# Patient Record
Sex: FEMALE | Race: BLACK OR AFRICAN AMERICAN | Employment: UNEMPLOYED | URBAN - METROPOLITAN AREA
[De-identification: names, ages, dates, MRNs, and addresses within clinical notes are randomized per-mention and may not be internally consistent; named-entity substitution may affect disease eponyms.]

---

## 2023-10-02 ENCOUNTER — CLINICAL SUPPORT (OUTPATIENT)
Dept: URGENT CARE | Facility: CLINIC | Age: 63
End: 2023-10-02
Payer: COMMERCIAL

## 2023-10-02 VITALS
HEART RATE: 79 BPM | RESPIRATION RATE: 17 BRPM | TEMPERATURE: 98.7 F | DIASTOLIC BLOOD PRESSURE: 85 MMHG | SYSTOLIC BLOOD PRESSURE: 158 MMHG | BODY MASS INDEX: 33.65 KG/M2 | OXYGEN SATURATION: 95 % | WEIGHT: 166.6 LBS

## 2023-10-02 DIAGNOSIS — M54.50 ACUTE BILATERAL LOW BACK PAIN WITHOUT SCIATICA: Primary | ICD-10-CM

## 2023-10-02 PROCEDURE — 99213 OFFICE O/P EST LOW 20 MIN: CPT | Performed by: NURSE PRACTITIONER

## 2023-10-02 PROCEDURE — 1036F TOBACCO NON-USER: CPT | Performed by: NURSE PRACTITIONER

## 2023-10-02 RX ORDER — AMLODIPINE BESYLATE 5 MG/1
5 TABLET ORAL DAILY
COMMUNITY

## 2023-10-02 RX ORDER — PREDNISONE 20 MG/1
40 TABLET ORAL DAILY
Qty: 10 TABLET | Refills: 0 | Status: SHIPPED | OUTPATIENT
Start: 2023-10-02 | End: 2023-10-07

## 2023-10-02 RX ORDER — PANTOPRAZOLE SODIUM 20 MG/1
20 TABLET, DELAYED RELEASE ORAL
COMMUNITY

## 2023-10-02 ASSESSMENT — ENCOUNTER SYMPTOMS
BACK PAIN: 1
LEG PAIN: 1

## 2023-10-02 NOTE — PROGRESS NOTES
Subjective   Patient ID: Nicole Garcia is a 63 y.o. female.    Patient complains of lower back pain.  States symptoms started 2 weeks ago.  States the pain is located bilateral lower extremities rates it 6 out of 10.  States the pain is aggravated by walking and mostly at night and alleviated by resting.  Patient denies any history of IV drug use, cancer, chemotherapy in the last 6 months, incontinence of bladder or bladder, saddle anesthesia.  Patient also denies any weakness, falls or secondary trauma        Leg Pain     Back Pain  Associated symptoms include leg pain.       The following portions of the chart were reviewed this encounter and updated as appropriate:         Review of Systems   Musculoskeletal:  Positive for back pain.   All other systems reviewed and are negative.    Objective   Physical Exam  Vitals and nursing note reviewed.   Constitutional:       General: She is not in acute distress.     Appearance: Normal appearance. She is not toxic-appearing.   HENT:      Head: Normocephalic.      Right Ear: External ear normal.      Left Ear: External ear normal.      Nose: Nose normal.      Mouth/Throat:      Mouth: Mucous membranes are moist.      Pharynx: No oropharyngeal exudate or posterior oropharyngeal erythema.   Eyes:      Extraocular Movements: Extraocular movements intact.   Cardiovascular:      Rate and Rhythm: Normal rate and regular rhythm.      Heart sounds: Normal heart sounds.   Pulmonary:      Effort: Pulmonary effort is normal.      Breath sounds: Normal breath sounds. No wheezing.   Musculoskeletal:         General: Normal range of motion.      Cervical back: Normal, normal range of motion and neck supple.      Thoracic back: Normal.      Lumbar back: Normal. Negative right straight leg raise test and negative left straight leg raise test.   Skin:     Capillary Refill: Capillary refill takes less than 2 seconds.   Neurological:      General: No focal deficit present.      Mental  Status: She is alert and oriented to person, place, and time.      Cranial Nerves: Cranial nerves 2-12 are intact.      Sensory: Sensation is intact.      Motor: Motor function is intact.      Gait: Gait is intact.      Deep Tendon Reflexes: Reflexes are normal and symmetric.      Reflex Scores:       Patellar reflexes are 2+ on the right side and 2+ on the left side.  Psychiatric:         Mood and Affect: Mood normal.         Behavior: Behavior normal.         Thought Content: Thought content normal.       Procedures    Assessment/Plan   Diagnoses and all orders for this visit:  Acute bilateral low back pain without sciatica  -     predniSONE (Deltasone) 20 mg tablet; Take 2 tablets (40 mg) by mouth once daily for 5 days.  Start prednisone therapy  May also use Tylenol  Inform patient that she is going to follow-up with this practice this may be musculoskeletal versus circulatory she may benefit from possible ankle-brachial index for further evaluation    Above plan of care was reviewed with the patient, all questions were answered, through shared decision making the patient agrees with this plan of care.    Red flags for strict return precaution have been reviewed with the patient and or patient tiffanydian, patient is alert, oriented and non-toxic appearing, has decision making capabilities and agrees with the plan of care through shared decision making at this time. Current diagnosis, any medication changes, lab or radiologic results have been reviewed with the patient at the time of visit. If symptoms do not improve, or worsen, patient is to follow up with PCP or report to the emergency room.   Patient is alert and oriented x3 vital signs stable nontoxic-appearing and has decision-making capabilities.    Please note that the majority this note was made with Dragon speaking software there may be grammatical errors secondary to the speaking program.

## 2023-10-08 PROBLEM — K02.9 PAIN DUE TO DENTAL CARIES: Status: ACTIVE | Noted: 2023-10-08

## 2023-10-08 PROBLEM — M54.30 SCIATIC LEG PAIN: Status: ACTIVE | Noted: 2023-10-08

## 2023-10-08 PROBLEM — M25.562 KNEE PAIN, BILATERAL: Status: ACTIVE | Noted: 2023-10-08

## 2023-10-08 PROBLEM — M25.561 KNEE PAIN, BILATERAL: Status: ACTIVE | Noted: 2023-10-08

## 2023-10-13 ENCOUNTER — APPOINTMENT (OUTPATIENT)
Dept: PRIMARY CARE | Facility: CLINIC | Age: 63
End: 2023-10-13
Payer: COMMERCIAL

## 2024-06-12 ENCOUNTER — OFFICE VISIT (OUTPATIENT)
Dept: URGENT CARE | Facility: CLINIC | Age: 64
End: 2024-06-12
Payer: COMMERCIAL

## 2024-06-12 VITALS
HEIGHT: 59 IN | WEIGHT: 171 LBS | BODY MASS INDEX: 34.47 KG/M2 | SYSTOLIC BLOOD PRESSURE: 146 MMHG | HEART RATE: 78 BPM | TEMPERATURE: 97.9 F | DIASTOLIC BLOOD PRESSURE: 83 MMHG | OXYGEN SATURATION: 95 %

## 2024-06-12 DIAGNOSIS — M25.511 ACUTE PAIN OF RIGHT SHOULDER: ICD-10-CM

## 2024-06-12 DIAGNOSIS — M15.9 OSTEOARTHRITIS OF MULTIPLE JOINTS, UNSPECIFIED OSTEOARTHRITIS TYPE: Primary | ICD-10-CM

## 2024-06-12 DIAGNOSIS — G89.29 CHRONIC RIGHT-SIDED LOW BACK PAIN WITH RIGHT-SIDED SCIATICA: ICD-10-CM

## 2024-06-12 DIAGNOSIS — M54.41 CHRONIC RIGHT-SIDED LOW BACK PAIN WITH RIGHT-SIDED SCIATICA: ICD-10-CM

## 2024-06-12 PROBLEM — I10 ESSENTIAL HYPERTENSION: Status: ACTIVE | Noted: 2017-06-22

## 2024-06-12 PROBLEM — E78.5 HYPERLIPIDEMIA: Status: ACTIVE | Noted: 2023-07-24

## 2024-06-12 PROBLEM — R19.5 POSITIVE COLORECTAL CANCER SCREENING USING COLOGUARD TEST: Status: ACTIVE | Noted: 2023-08-29

## 2024-06-12 PROBLEM — G45.9 TIA (TRANSIENT ISCHEMIC ATTACK): Status: ACTIVE | Noted: 2023-11-21

## 2024-06-12 PROBLEM — E55.9 VITAMIN D DEFICIENCY: Status: ACTIVE | Noted: 2023-07-24

## 2024-06-12 PROBLEM — E66.9 OBESITY (BMI 30-39.9): Status: ACTIVE | Noted: 2021-05-26

## 2024-06-12 PROBLEM — K21.9 GASTROESOPHAGEAL REFLUX DISEASE WITHOUT ESOPHAGITIS: Status: ACTIVE | Noted: 2017-10-15

## 2024-06-12 PROBLEM — M19.90 OSTEOARTHRITIS: Status: ACTIVE | Noted: 2024-06-12

## 2024-06-12 PROBLEM — K02.9 DENTAL CARIES: Status: ACTIVE | Noted: 2022-06-08

## 2024-06-12 PROBLEM — H25.9 AGE-RELATED CATARACT OF RIGHT EYE: Status: ACTIVE | Noted: 2024-02-02

## 2024-06-12 PROBLEM — F33.9 CHRONIC RECURRENT MAJOR DEPRESSIVE DISORDER (CMS-HCC): Status: ACTIVE | Noted: 2022-01-28

## 2024-06-12 PROBLEM — R73.03 PREDIABETES: Status: ACTIVE | Noted: 2017-10-15

## 2024-06-12 PROCEDURE — 96372 THER/PROPH/DIAG INJ SC/IM: CPT

## 2024-06-12 PROCEDURE — 3077F SYST BP >= 140 MM HG: CPT

## 2024-06-12 PROCEDURE — 3079F DIAST BP 80-89 MM HG: CPT

## 2024-06-12 PROCEDURE — 99213 OFFICE O/P EST LOW 20 MIN: CPT

## 2024-06-12 RX ORDER — POLYETHYLENE GLYCOL-3350 AND ELECTROLYTES 236; 6.74; 5.86; 2.97; 22.74 G/274.31G; G/274.31G; G/274.31G; G/274.31G; G/274.31G
POWDER, FOR SOLUTION ORAL
COMMUNITY
Start: 2023-08-23

## 2024-06-12 RX ORDER — TIZANIDINE 2 MG/1
TABLET ORAL
COMMUNITY
Start: 2024-01-26 | End: 2024-06-12 | Stop reason: ALTCHOICE

## 2024-06-12 RX ORDER — ATORVASTATIN CALCIUM 40 MG/1
1 TABLET, FILM COATED ORAL
COMMUNITY
Start: 2023-11-13

## 2024-06-12 RX ORDER — ASPIRIN 81 MG/1
81 TABLET ORAL
COMMUNITY

## 2024-06-12 RX ORDER — ACETAMINOPHEN 500 MG
TABLET ORAL
COMMUNITY
Start: 2024-01-26

## 2024-06-12 RX ORDER — ERGOCALCIFEROL 1.25 1/1
CAPSULE ORAL
COMMUNITY
Start: 2023-08-23

## 2024-06-12 RX ORDER — METHOCARBAMOL 500 MG/1
TABLET, FILM COATED ORAL
COMMUNITY
Start: 2023-10-27

## 2024-06-12 RX ORDER — PREDNISONE 20 MG/1
TABLET ORAL
COMMUNITY
Start: 2023-10-27 | End: 2024-06-12 | Stop reason: ALTCHOICE

## 2024-06-12 RX ORDER — DICLOFENAC SODIUM 10 MG/G
GEL TOPICAL
COMMUNITY
Start: 2023-09-20

## 2024-06-12 RX ORDER — BROMPHENIRAMINE MALEATE, PSEUDOEPHEDRINE HYDROCHLORIDE, AND DEXTROMETHORPHAN HYDROBROMIDE 2; 30; 10 MG/5ML; MG/5ML; MG/5ML
SYRUP ORAL
COMMUNITY
Start: 2023-12-19

## 2024-06-12 RX ORDER — SPIRONOLACTONE 25 MG/1
0.5 TABLET ORAL DAILY
COMMUNITY
Start: 2022-08-30

## 2024-06-12 RX ORDER — PREDNISOLONE 15 MG/5ML
SOLUTION ORAL DAILY
Qty: 50 ML | Refills: 0 | Status: SHIPPED | OUTPATIENT
Start: 2024-06-12 | End: 2024-06-20

## 2024-06-12 RX ORDER — DULOXETIN HYDROCHLORIDE 30 MG/1
CAPSULE, DELAYED RELEASE ORAL
COMMUNITY
Start: 2024-02-02

## 2024-06-12 RX ORDER — KETOROLAC TROMETHAMINE 30 MG/ML
30 INJECTION, SOLUTION INTRAMUSCULAR; INTRAVENOUS ONCE
Status: COMPLETED | OUTPATIENT
Start: 2024-06-12 | End: 2024-06-12

## 2024-06-12 RX ORDER — PREDNISONE 50 MG/1
TABLET ORAL
COMMUNITY
Start: 2023-12-15 | End: 2024-06-12 | Stop reason: ALTCHOICE

## 2024-06-12 RX ORDER — IBUPROFEN 400 MG/1
TABLET ORAL
COMMUNITY
Start: 2024-02-02 | End: 2024-06-12 | Stop reason: ALTCHOICE

## 2024-06-12 RX ORDER — ROSUVASTATIN CALCIUM 20 MG/1
TABLET, COATED ORAL
COMMUNITY
Start: 2023-11-22

## 2024-06-12 RX ORDER — AMOXICILLIN AND CLAVULANATE POTASSIUM 875; 125 MG/1; MG/1
TABLET, FILM COATED ORAL
COMMUNITY
Start: 2023-12-19 | End: 2024-06-12 | Stop reason: ALTCHOICE

## 2024-06-12 RX ORDER — CLOPIDOGREL BISULFATE 75 MG/1
1 TABLET ORAL
COMMUNITY
Start: 2023-11-13

## 2024-06-12 RX ORDER — TIZANIDINE 4 MG/1
4 TABLET ORAL EVERY 8 HOURS PRN
Qty: 21 TABLET | Refills: 0 | Status: SHIPPED | OUTPATIENT
Start: 2024-06-12 | End: 2024-06-19

## 2024-06-12 RX ORDER — OMEPRAZOLE 20 MG/1
CAPSULE, DELAYED RELEASE ORAL
COMMUNITY
Start: 2024-01-19

## 2024-06-12 ASSESSMENT — ENCOUNTER SYMPTOMS
ARTHRALGIAS: 1
DIARRHEA: 0
CHILLS: 0
ABDOMINAL PAIN: 0
NEUROLOGICAL NEGATIVE: 1
FEVER: 0
MYALGIAS: 1
NAUSEA: 0
CARDIOVASCULAR NEGATIVE: 1
SHORTNESS OF BREATH: 0
MUSCULOSKELETAL PAIN: 1
HEADACHES: 0
CONSTITUTIONAL NEGATIVE: 1
COUGH: 0
DIZZINESS: 0
FATIGUE: 0
PALPITATIONS: 0
DIAPHORESIS: 0
RESPIRATORY NEGATIVE: 1
VOMITING: 0
GASTROINTESTINAL NEGATIVE: 1

## 2024-06-12 NOTE — PROGRESS NOTES
"Subjective   History  Nicole Garcia is a 63 y.o. female who presents for Muscle Pain.    Patient presents with pain in her right thigh, knee, and arm for the last 7 days. She reports a history of arthritis, knee pain, and right sided sciatica. She reports that the pain seems worst at night.       History provided by:  Patient and medical records   used: No    Muscle Pain  Associated symptoms: myalgias    Associated symptoms: no abdominal pain, no chest pain, no congestion, no cough, no diarrhea, no fatigue, no fever, no headaches, no nausea, no rash, no rhinorrhea, no shortness of breath, no sore throat and no vomiting        No past surgical history on file.  Social History     Tobacco Use    Smoking status: Never    Smokeless tobacco: Never   Substance Use Topics    Alcohol use: Never    Drug use: Never       Review of Systems   Constitutional: Negative.  Negative for chills, diaphoresis, fatigue and fever.   HENT: Negative.  Negative for congestion, facial swelling, rhinorrhea, sore throat, trouble swallowing and voice change.    Respiratory: Negative.  Negative for cough and shortness of breath.    Cardiovascular: Negative.  Negative for chest pain and palpitations.   Gastrointestinal: Negative.  Negative for abdominal pain, diarrhea, nausea and vomiting.   Musculoskeletal:  Positive for arthralgias and myalgias.   Skin: Negative.  Negative for rash.   Neurological: Negative.  Negative for dizziness and headaches.       Objective   Vital Signs  /83 (BP Location: Right arm, Patient Position: Sitting, BP Cuff Size: Small adult)   Pulse 78   Temp 36.6 °C (97.9 °F) (Oral)   Ht 1.499 m (4' 11\")   Wt 77.6 kg (171 lb)   SpO2 95%   BMI 34.54 kg/m²    All vitals have been reviewed and are stable.    Diagnostic Results    No results found for this or any previous visit (from the past 24 hour(s)).     Physical Exam  Physical Exam  Vitals and nursing note reviewed.   Constitutional:       " General: She is awake. She is not in acute distress.     Appearance: Normal appearance. She is well-developed. She is not ill-appearing, toxic-appearing or diaphoretic.   HENT:      Head: Normocephalic and atraumatic. No right periorbital erythema or left periorbital erythema.      Jaw: There is normal jaw occlusion.      Right Ear: External ear normal.      Left Ear: External ear normal.      Nose: Nose normal. No congestion or rhinorrhea.      Mouth/Throat:      Lips: Pink. No lesions.      Mouth: Mucous membranes are moist. No oral lesions or angioedema.      Pharynx: Oropharynx is clear.   Eyes:      General: Lids are normal. Vision grossly intact. Gaze aligned appropriately.      Extraocular Movements: Extraocular movements intact.      Conjunctiva/sclera: Conjunctivae normal.      Right eye: Right conjunctiva is not injected.      Left eye: Left conjunctiva is not injected.      Pupils: Pupils are equal, round, and reactive to light.   Cardiovascular:      Rate and Rhythm: Normal rate and regular rhythm.      Heart sounds: Normal heart sounds, S1 normal and S2 normal.   Pulmonary:      Effort: Pulmonary effort is normal. No tachypnea or respiratory distress.      Breath sounds: Normal air entry. No stridor. No wheezing.   Abdominal:      General: Abdomen is flat. There is no distension.      Palpations: Abdomen is soft.   Musculoskeletal:         General: No swelling or deformity.      Right shoulder: Swelling and tenderness present. No effusion or bony tenderness. Normal range of motion. Normal strength. Normal pulse.      Left shoulder: Normal.      Right upper arm: Normal. No swelling or tenderness.      Left upper arm: Normal. No swelling or tenderness.      Cervical back: Normal, full passive range of motion without pain, normal range of motion and neck supple. No edema or bony tenderness. No pain with movement or spinous process tenderness. Normal range of motion.      Thoracic back: Normal. No spasms,  tenderness or bony tenderness. Normal range of motion.      Lumbar back: Spasms and tenderness present. No swelling, deformity or bony tenderness. Decreased range of motion.      Right upper leg: Tenderness present. No swelling, deformity or bony tenderness.      Left upper leg: Normal.      Right knee: Normal. No swelling, erythema or bony tenderness. Normal range of motion. No tenderness.      Left knee: Normal.      Right lower leg: Normal. No swelling.      Left lower leg: Normal. No swelling.   Lymphadenopathy:      Cervical: No cervical adenopathy.   Skin:     General: Skin is warm and dry.      Findings: No erythema or rash.   Neurological:      General: No focal deficit present.      Mental Status: She is alert and oriented to person, place, and time. Mental status is at baseline.      Motor: Motor function is intact.      Coordination: Coordination is intact.   Psychiatric:         Mood and Affect: Mood and affect normal.         Speech: Speech normal.         Behavior: Behavior normal. Behavior is cooperative.         Thought Content: Thought content normal.         Judgment: Judgment normal.         Assessment/Plan     Problem List Items Addressed This Visit       Chronic right-sided low back pain with right-sided sciatica    Relevant Medications    prednisoLONE (Prelone) 15 mg/5 mL syrup    tiZANidine (Zanaflex) 4 mg tablet    ketorolac (Toradol) injection 30 mg (Start on 6/12/2024  7:00 PM)    Osteoarthritis - Primary    Relevant Medications    prednisoLONE (Prelone) 15 mg/5 mL syrup    ketorolac (Toradol) injection 30 mg (Start on 6/12/2024  7:00 PM)     Other Visit Diagnoses       Acute pain of right shoulder        Relevant Medications    prednisoLONE (Prelone) 15 mg/5 mL syrup    tiZANidine (Zanaflex) 4 mg tablet    ketorolac (Toradol) injection 30 mg (Start on 6/12/2024  7:00 PM)            [facility] Course  MDM    Patient disposition: Home    Red flags for reporting to ER have been reviewed with the  patient.    Current diagnosis, any medication changes, and all in-office lab or radiologic results have been reviewed with the patient at the time of the visit.   If symptoms do not improve or worsen, patient is to follow up with PCP or report to the emergency room.   Patient is alert and oriented x3 and non-toxic appearing. Vital signs are stable.   Patient and/or guardian has sufficient decision-making capabilities at this time and reports understanding and agreement with the treatment plan made through shared decision-making.

## 2024-06-27 ASSESSMENT — ENCOUNTER SYMPTOMS
RHINORRHEA: 0
VOICE CHANGE: 0
SORE THROAT: 0
TROUBLE SWALLOWING: 0
FACIAL SWELLING: 0

## 2024-06-27 ASSESSMENT — VISUAL ACUITY: OU: 1

## 2024-12-29 ENCOUNTER — OFFICE VISIT (OUTPATIENT)
Dept: URGENT CARE | Facility: CLINIC | Age: 64
End: 2024-12-29
Payer: COMMERCIAL

## 2024-12-29 VITALS
HEIGHT: 59 IN | SYSTOLIC BLOOD PRESSURE: 134 MMHG | OXYGEN SATURATION: 98 % | HEART RATE: 85 BPM | BODY MASS INDEX: 33.67 KG/M2 | WEIGHT: 167 LBS | DIASTOLIC BLOOD PRESSURE: 85 MMHG | TEMPERATURE: 98.4 F

## 2024-12-29 DIAGNOSIS — J06.9 ACUTE UPPER RESPIRATORY INFECTION: ICD-10-CM

## 2024-12-29 DIAGNOSIS — J45.30 MILD PERSISTENT ASTHMA WITHOUT COMPLICATION (HHS-HCC): ICD-10-CM

## 2024-12-29 DIAGNOSIS — M54.41 CHRONIC RIGHT-SIDED LOW BACK PAIN WITH RIGHT-SIDED SCIATICA: ICD-10-CM

## 2024-12-29 DIAGNOSIS — M79.604 RIGHT LEG PAIN: ICD-10-CM

## 2024-12-29 DIAGNOSIS — G89.29 CHRONIC RIGHT-SIDED LOW BACK PAIN WITH RIGHT-SIDED SCIATICA: ICD-10-CM

## 2024-12-29 DIAGNOSIS — H66.93 ACUTE OTITIS MEDIA, BILATERAL: Primary | ICD-10-CM

## 2024-12-29 PROBLEM — U07.1 COVID-19 VIRUS INFECTION: Status: RESOLVED | Noted: 2023-12-11 | Resolved: 2024-12-29

## 2024-12-29 PROBLEM — D75.839 THROMBOCYTOSIS, UNSPECIFIED: Status: ACTIVE | Noted: 2023-11-20

## 2024-12-29 PROBLEM — R53.1 WEAKNESS: Status: ACTIVE | Noted: 2023-11-20

## 2024-12-29 PROBLEM — Z79.82 LONG TERM (CURRENT) USE OF ASPIRIN: Status: ACTIVE | Noted: 2023-12-11

## 2024-12-29 PROBLEM — K21.9 GASTRO-ESOPHAGEAL REFLUX DISEASE WITHOUT ESOPHAGITIS: Status: ACTIVE | Noted: 2023-11-20

## 2024-12-29 PROBLEM — J45.909 UNSPECIFIED ASTHMA, UNCOMPLICATED (HHS-HCC): Status: ACTIVE | Noted: 2023-11-20

## 2024-12-29 PROBLEM — R05.9 COUGH, UNSPECIFIED: Status: ACTIVE | Noted: 2023-12-11

## 2024-12-29 PROBLEM — M19.90 UNSPECIFIED OSTEOARTHRITIS, UNSPECIFIED SITE: Status: ACTIVE | Noted: 2023-11-20

## 2024-12-29 PROBLEM — G45.9 TRANSIENT CEREBRAL ISCHEMIC ATTACK, UNSPECIFIED: Status: ACTIVE | Noted: 2023-11-20

## 2024-12-29 PROBLEM — L80 VITILIGO: Status: ACTIVE | Noted: 2023-11-20

## 2024-12-29 PROBLEM — Z79.02 LONG TERM (CURRENT) USE OF ANTITHROMBOTICS/ANTIPLATELETS: Status: ACTIVE | Noted: 2023-12-11

## 2024-12-29 PROBLEM — M25.569 KNEE PAIN: Status: ACTIVE | Noted: 2024-12-29

## 2024-12-29 PROCEDURE — 3079F DIAST BP 80-89 MM HG: CPT

## 2024-12-29 PROCEDURE — 3008F BODY MASS INDEX DOCD: CPT

## 2024-12-29 PROCEDURE — 99215 OFFICE O/P EST HI 40 MIN: CPT

## 2024-12-29 PROCEDURE — 1159F MED LIST DOCD IN RCRD: CPT

## 2024-12-29 PROCEDURE — 3075F SYST BP GE 130 - 139MM HG: CPT

## 2024-12-29 RX ORDER — TIZANIDINE 2 MG/1
2 TABLET ORAL EVERY 6 HOURS PRN
Qty: 28 TABLET | Refills: 0 | Status: SHIPPED | OUTPATIENT
Start: 2024-12-29 | End: 2025-01-05

## 2024-12-29 RX ORDER — IBUPROFEN 200 MG
400 TABLET ORAL EVERY 6 HOURS PRN
Qty: 30 TABLET | Refills: 0 | Status: SHIPPED
Start: 2024-12-29 | End: 2024-12-29

## 2024-12-29 RX ORDER — MONTELUKAST SODIUM 10 MG/1
10 TABLET ORAL NIGHTLY
Qty: 30 TABLET | Refills: 0 | Status: SHIPPED | OUTPATIENT
Start: 2024-12-29 | End: 2025-01-28

## 2024-12-29 RX ORDER — ALBUTEROL SULFATE 90 UG/1
2 INHALANT RESPIRATORY (INHALATION) EVERY 6 HOURS PRN
Qty: 18 G | Refills: 0 | Status: SHIPPED | OUTPATIENT
Start: 2024-12-29 | End: 2025-12-29

## 2024-12-29 RX ORDER — NAPROXEN 375 MG/1
TABLET ORAL
COMMUNITY
Start: 2024-12-24

## 2024-12-29 RX ORDER — ROSUVASTATIN CALCIUM 5 MG/1
TABLET, COATED ORAL
COMMUNITY
Start: 2024-07-25

## 2024-12-29 RX ORDER — IBUPROFEN 200 MG
TABLET ORAL
COMMUNITY
Start: 2024-07-25 | End: 2024-12-29 | Stop reason: SDUPTHER

## 2024-12-29 RX ORDER — AMOXICILLIN AND CLAVULANATE POTASSIUM 875; 125 MG/1; MG/1
1 TABLET, FILM COATED ORAL 2 TIMES DAILY
Qty: 14 TABLET | Refills: 0 | Status: SHIPPED | OUTPATIENT
Start: 2024-12-29 | End: 2025-01-05

## 2024-12-29 RX ORDER — IBUPROFEN 400 MG/1
400 TABLET ORAL EVERY 6 HOURS PRN
Qty: 30 TABLET | Refills: 0 | Status: SHIPPED | OUTPATIENT
Start: 2024-12-29 | End: 2025-01-08

## 2024-12-29 ASSESSMENT — ENCOUNTER SYMPTOMS
COUGH: 1
PALPITATIONS: 0
SORE THROAT: 0
VOMITING: 0
ARTHRALGIAS: 0
SINUS PRESSURE: 1
DIZZINESS: 0
ABDOMINAL PAIN: 0
CARDIOVASCULAR NEGATIVE: 1
LEG PAIN: 1
NAUSEA: 0
FATIGUE: 0
DIARRHEA: 0
FEVER: 0
MYALGIAS: 0
CONSTITUTIONAL NEGATIVE: 1
WOUND: 0
VOICE CHANGE: 0
CHILLS: 0
DIAPHORESIS: 0
HEADACHES: 0
TROUBLE SWALLOWING: 0
GASTROINTESTINAL NEGATIVE: 1
NEUROLOGICAL NEGATIVE: 1
COLOR CHANGE: 0
EYE REDNESS: 0
RHINORRHEA: 1
MUSCULOSKELETAL NEGATIVE: 1
WEAKNESS: 0
FACIAL SWELLING: 0
SHORTNESS OF BREATH: 0

## 2025-01-14 ASSESSMENT — ENCOUNTER SYMPTOMS
CHEST TIGHTNESS: 0
WHEEZING: 0
ARTHRALGIAS: 1
MYALGIAS: 1
BACK PAIN: 1
JOINT SWELLING: 0
NECK STIFFNESS: 0
DIFFICULTY URINATING: 0

## 2025-01-14 ASSESSMENT — VISUAL ACUITY: OU: 1

## 2025-07-07 ENCOUNTER — OFFICE VISIT (OUTPATIENT)
Facility: CLINIC | Age: 65
End: 2025-07-07
Payer: COMMERCIAL

## 2025-07-07 VITALS
TEMPERATURE: 98.3 F | OXYGEN SATURATION: 95 % | DIASTOLIC BLOOD PRESSURE: 63 MMHG | WEIGHT: 161 LBS | HEART RATE: 66 BPM | HEIGHT: 59 IN | SYSTOLIC BLOOD PRESSURE: 112 MMHG | BODY MASS INDEX: 32.46 KG/M2

## 2025-07-07 DIAGNOSIS — M54.32 BILATERAL SCIATICA: Primary | ICD-10-CM

## 2025-07-07 DIAGNOSIS — M54.31 BILATERAL SCIATICA: Primary | ICD-10-CM

## 2025-07-07 PROCEDURE — 3078F DIAST BP <80 MM HG: CPT | Performed by: PHYSICIAN ASSISTANT

## 2025-07-07 PROCEDURE — 1159F MED LIST DOCD IN RCRD: CPT | Performed by: PHYSICIAN ASSISTANT

## 2025-07-07 PROCEDURE — 99213 OFFICE O/P EST LOW 20 MIN: CPT | Performed by: PHYSICIAN ASSISTANT

## 2025-07-07 PROCEDURE — 3074F SYST BP LT 130 MM HG: CPT | Performed by: PHYSICIAN ASSISTANT

## 2025-07-07 PROCEDURE — 3008F BODY MASS INDEX DOCD: CPT | Performed by: PHYSICIAN ASSISTANT

## 2025-07-07 PROCEDURE — 96372 THER/PROPH/DIAG INJ SC/IM: CPT | Performed by: PHYSICIAN ASSISTANT

## 2025-07-07 RX ORDER — PREDNISONE 20 MG/1
TABLET ORAL
Qty: 12 TABLET | Refills: 0 | Status: SHIPPED | OUTPATIENT
Start: 2025-07-07 | End: 2025-07-13

## 2025-07-07 RX ORDER — TIZANIDINE 4 MG/1
4 TABLET ORAL EVERY 8 HOURS PRN
Qty: 15 TABLET | Refills: 0 | Status: SHIPPED | OUTPATIENT
Start: 2025-07-07 | End: 2025-07-12

## 2025-07-07 RX ORDER — KETOROLAC TROMETHAMINE 30 MG/ML
30 INJECTION, SOLUTION INTRAMUSCULAR; INTRAVENOUS ONCE
Status: COMPLETED | OUTPATIENT
Start: 2025-07-07 | End: 2025-07-07

## 2025-07-07 RX ADMIN — KETOROLAC TROMETHAMINE 30 MG: 30 INJECTION, SOLUTION INTRAMUSCULAR; INTRAVENOUS at 14:32

## 2025-07-07 NOTE — PROGRESS NOTES
Subjective   Patient ID: Nicole Garcia is a 65 y.o. female.    Pt presents today c/o low back pain that starts at the hips and goes down bilateral legs. She has been seen in our urgent care previously for sciatica, was given prednisone and tizanidine, as well as a Toradol injection at past visits. States that she was given an injection at orthopedics on 5/6/25 for her hip. Sees [facility]. She is not on any daily medication for her arthritis. States that the shot was working, but then around 1 month ago it stopped working. She is planning to follow up again with them next month, but is wanting relief sooner. Denies: fever, chills, headache, numbness/tingling, loss of control of bowel/bladder, saddle anesthesia.     PCP is [facility] at [address] [place] [address].       Review of Systems   All other systems reviewed and are negative.      Objective     Physical Exam  Vitals reviewed.   Constitutional:       General: She is awake.      Appearance: Normal appearance. She is well-developed.   HENT:      Head: Normocephalic and atraumatic.   Cardiovascular:      Rate and Rhythm: Normal rate.   Pulmonary:      Effort: Pulmonary effort is normal.   Musculoskeletal:      Cervical back: Full passive range of motion without pain.      Lumbar back: Tenderness (over the right and left sciatic nerves with reproducible radicular pain) present. No spasms.      Right lower leg: No edema.      Left lower leg: No edema.   Skin:     General: Skin is warm and dry.      Findings: No lesion or rash.   Neurological:      General: No focal deficit present.      Mental Status: She is alert and oriented to person, place, and time.      Cranial Nerves: No facial asymmetry.      Motor: Motor function is intact.      Gait: Gait is intact.   Psychiatric:         Attention and Perception: Attention normal.         Mood and Affect: Mood and affect normal.         Assessment/Plan   Problem List Items Addressed This Visit    None  Visit  Diagnoses         Codes      Bilateral sciatica    -  Primary M54.31, M54.32    Relevant Medications    ketorolac (Toradol) injection 30 mg (Completed) (Start on 7/7/2025  2:45 PM)    predniSONE (Deltasone) 20 mg tablet    tiZANidine (Zanaflex) 4 mg tablet          Continue with PT - discuss water therapy  Continue with ortho follow up - call to schedule appt  Toradol IM injection given today  Prednisone Rx - can use beginning tomorrow  Tizanidine Rx - do not use while working/driving/needing to be alert - can cause drowsiness     Red flag symptoms reviewed with patient and all questions answered to patient or guardian's satisfaction. Patient or guardian verbalized understanding and agreement with care plan as above. All in office testing reviewed with patient/guardian. If symptoms worsen or do not improve, patient is to follow up with PCP or report to the ER.      Patient disposition: Home